# Patient Record
Sex: FEMALE
[De-identification: names, ages, dates, MRNs, and addresses within clinical notes are randomized per-mention and may not be internally consistent; named-entity substitution may affect disease eponyms.]

---

## 2017-05-15 PROBLEM — Z00.00 ENCOUNTER FOR PREVENTIVE HEALTH EXAMINATION: Status: ACTIVE | Noted: 2017-05-15

## 2017-05-22 ENCOUNTER — APPOINTMENT (OUTPATIENT)
Dept: RADIOLOGY | Facility: HOSPITAL | Age: 37
End: 2017-05-22

## 2021-06-29 ENCOUNTER — TRANSCRIPTION ENCOUNTER (OUTPATIENT)
Age: 41
End: 2021-06-29

## 2022-11-09 ENCOUNTER — RESULT REVIEW (OUTPATIENT)
Age: 42
End: 2022-11-09

## 2024-07-22 ENCOUNTER — NON-APPOINTMENT (OUTPATIENT)
Age: 44
End: 2024-07-22

## 2024-07-22 ENCOUNTER — APPOINTMENT (OUTPATIENT)
Dept: OPHTHALMOLOGY | Facility: CLINIC | Age: 44
End: 2024-07-22
Payer: COMMERCIAL

## 2024-07-22 PROCEDURE — 92083 EXTENDED VISUAL FIELD XM: CPT

## 2024-07-22 PROCEDURE — 99204 OFFICE O/P NEW MOD 45 MIN: CPT

## 2024-07-22 PROCEDURE — 92133 CPTRZD OPH DX IMG PST SGM ON: CPT

## 2024-07-23 ENCOUNTER — NON-APPOINTMENT (OUTPATIENT)
Age: 44
End: 2024-07-23

## 2024-08-26 ENCOUNTER — APPOINTMENT (OUTPATIENT)
Dept: OPHTHALMOLOGY | Facility: CLINIC | Age: 44
End: 2024-08-26
Payer: COMMERCIAL

## 2024-08-26 ENCOUNTER — TRANSCRIPTION ENCOUNTER (OUTPATIENT)
Age: 44
End: 2024-08-26

## 2024-08-26 ENCOUNTER — NON-APPOINTMENT (OUTPATIENT)
Age: 44
End: 2024-08-26

## 2024-08-26 PROCEDURE — 76514 ECHO EXAM OF EYE THICKNESS: CPT

## 2024-08-26 PROCEDURE — 92002 INTRM OPH EXAM NEW PATIENT: CPT

## 2024-09-25 ENCOUNTER — APPOINTMENT (OUTPATIENT)
Dept: PEDIATRIC ALLERGY IMMUNOLOGY | Facility: CLINIC | Age: 44
End: 2024-09-25
Payer: COMMERCIAL

## 2024-09-25 VITALS
OXYGEN SATURATION: 98 % | HEIGHT: 66.54 IN | SYSTOLIC BLOOD PRESSURE: 123 MMHG | WEIGHT: 191 LBS | HEART RATE: 75 BPM | BODY MASS INDEX: 30.34 KG/M2 | DIASTOLIC BLOOD PRESSURE: 78 MMHG

## 2024-09-25 DIAGNOSIS — Z91.09 OTHER ALLERGY STATUS, OTHER THAN TO DRUGS AND BIOLOGICAL SUBSTANCES: ICD-10-CM

## 2024-09-25 DIAGNOSIS — M35.00 SICCA SYNDROME, UNSPECIFIED: ICD-10-CM

## 2024-09-25 DIAGNOSIS — T78.1XXA OTHER ADVERSE FOOD REACTIONS, NOT ELSEWHERE CLASSIFIED, INITIAL ENCOUNTER: ICD-10-CM

## 2024-09-25 DIAGNOSIS — J30.81 ALLERGIC RHINITIS DUE TO ANIMAL (CAT) (DOG) HAIR AND DANDER: ICD-10-CM

## 2024-09-25 PROCEDURE — 95004 PERQ TESTS W/ALRGNC XTRCS: CPT

## 2024-09-25 PROCEDURE — 99204 OFFICE O/P NEW MOD 45 MIN: CPT | Mod: 25

## 2024-09-25 RX ORDER — HYDROXYCHLOROQUINE SULFATE 100 MG/1
100 TABLET ORAL
Refills: 0 | Status: ACTIVE | COMMUNITY

## 2024-09-25 NOTE — REASON FOR VISIT
[Initial Consultation] : an initial consultation for [Allergy Evaluation/ Skin Testing] : allergy evaluation and or skin testing [Congestion] : congestion [Runny Nose] : runny nose [To Food] : allergy to food

## 2024-09-26 PROBLEM — Z91.09 HOUSE DUST MITE ALLERGY: Status: ACTIVE | Noted: 2024-09-26

## 2024-09-26 PROBLEM — J30.81 ALLERGIC RHINITIS DUE TO ANIMAL DANDER: Status: ACTIVE | Noted: 2024-09-26

## 2024-09-26 PROBLEM — T78.1XXA ADVERSE REACTION TO FOOD, INITIAL ENCOUNTER: Status: ACTIVE | Noted: 2024-09-26

## 2024-09-27 NOTE — REVIEW OF SYSTEMS
right upper arm [Nl] : Genitourinary [Fatigue] : no fatigue [Fever] : no fever [Decreased Appetite] : no decrease in appetite [Nausea] : no nausea [Vomiting] : no vomiting [Diarrhea] : no diarrhea [Abdominal Pain] : no abdominal pain [Decrease In Appetite] : appetite not decreased

## 2024-09-27 NOTE — END OF VISIT
[FreeTextEntry3] : FRANCOISE Redmond has acted like a scribe on my behalf. I have reviewed the note and edited where appropriate. History, PE, assessment, and plan were personally performed by me.

## 2024-09-27 NOTE — HISTORY OF PRESENT ILLNESS
[de-identified] : This is a 44-year-old female presenting for an initial consultation.  There is a history of wheezing, throat tightness and itchy throat after ingestion of mushroom and chili peppers in May 2024. She was in [place] in [place] at the time. Patient took benadryl with good relief of symptoms. There was another episode of throat tightness after eating mushroom and chili peppers a month later at a different restaurant.  Patient subsequently seen by PCP who ordered labs. Labs were negative for mushroom and chili peppers. Since her reactions, she has incorporated and tolerated mushrooms in her diet without any issues. Patient has yet not tried chili peppers but has tried other peppers since. Denies history of reflux.  During Spring and Fall, patient gets mild runny nose, nasal congestion and post-nasal drip. Does not take anything for this as her symptoms are mild. 10 years ago, patient had allergy testing +tree pollen and cats.  No asthma. No eczema. No medication allergies. No pets at home.

## 2024-09-27 NOTE — HISTORY OF PRESENT ILLNESS
[de-identified] : This is a 44-year-old female presenting for an initial consultation.  There is a history of wheezing, throat tightness and itchy throat after ingestion of mushroom and chili peppers in May 2024. She was in [place] in [place] at the time. Patient took benadryl with good relief of symptoms. There was another episode of throat tightness after eating mushroom and chili peppers a month later at a different restaurant.  Patient subsequently seen by PCP who ordered labs. Labs were negative for mushroom and chili peppers. Since her reactions, she has incorporated and tolerated mushrooms in her diet without any issues. Patient has yet not tried chili peppers but has tried other peppers since. Denies history of reflux.  During Spring and Fall, patient gets mild runny nose, nasal congestion and post-nasal drip. Does not take anything for this as her symptoms are mild. 10 years ago, patient had allergy testing +tree pollen and cats.  No asthma. No eczema. No medication allergies. No pets at home.

## 2024-09-27 NOTE — HISTORY OF PRESENT ILLNESS
[de-identified] : This is a 44-year-old female presenting for an initial consultation.  There is a history of wheezing, throat tightness and itchy throat after ingestion of mushroom and chili peppers in May 2024. She was in [place] in [place] at the time. Patient took benadryl with good relief of symptoms. There was another episode of throat tightness after eating mushroom and chili peppers a month later at a different restaurant.  Patient subsequently seen by PCP who ordered labs. Labs were negative for mushroom and chili peppers. Since her reactions, she has incorporated and tolerated mushrooms in her diet without any issues. Patient has yet not tried chili peppers but has tried other peppers since. Denies history of reflux.  During Spring and Fall, patient gets mild runny nose, nasal congestion and post-nasal drip. Does not take anything for this as her symptoms are mild. 10 years ago, patient had allergy testing +tree pollen and cats.  No asthma. No eczema. No medication allergies. No pets at home.

## 2025-02-27 ENCOUNTER — APPOINTMENT (OUTPATIENT)
Dept: OPHTHALMOLOGY | Facility: CLINIC | Age: 45
End: 2025-02-27
Payer: COMMERCIAL

## 2025-02-27 ENCOUNTER — NON-APPOINTMENT (OUTPATIENT)
Age: 45
End: 2025-02-27

## 2025-02-27 PROCEDURE — 92012 INTRM OPH EXAM EST PATIENT: CPT

## 2025-02-27 PROCEDURE — 92083 EXTENDED VISUAL FIELD XM: CPT

## 2025-06-06 ENCOUNTER — APPOINTMENT (OUTPATIENT)
Dept: UROLOGY | Facility: CLINIC | Age: 45
End: 2025-06-06
Payer: COMMERCIAL

## 2025-06-06 VITALS
BODY MASS INDEX: 30.34 KG/M2 | SYSTOLIC BLOOD PRESSURE: 125 MMHG | HEART RATE: 74 BPM | OXYGEN SATURATION: 100 % | HEIGHT: 66.54 IN | DIASTOLIC BLOOD PRESSURE: 86 MMHG | RESPIRATION RATE: 16 BRPM | WEIGHT: 191 LBS

## 2025-06-06 PROCEDURE — 99204 OFFICE O/P NEW MOD 45 MIN: CPT

## 2025-06-11 LAB — BACTERIA UR CULT: NORMAL

## 2025-06-12 LAB
ANION GAP SERPL CALC-SCNC: 12 MMOL/L
APTT BLD: 31.3 SEC
BUN SERPL-MCNC: 14 MG/DL
CALCIUM SERPL-MCNC: 9.8 MG/DL
CHLORIDE SERPL-SCNC: 103 MMOL/L
CO2 SERPL-SCNC: 24 MMOL/L
CREAT SERPL-MCNC: 0.81 MG/DL
EGFRCR SERPLBLD CKD-EPI 2021: 91 ML/MIN/1.73M2
GLUCOSE SERPL-MCNC: 90 MG/DL
HCT VFR BLD CALC: 36.9 %
HGB BLD-MCNC: 11.8 G/DL
INR PPP: 0.97 RATIO
MCHC RBC-ENTMCNC: 28.6 PG
MCHC RBC-ENTMCNC: 32 G/DL
MCV RBC AUTO: 89.3 FL
PLATELET # BLD AUTO: 331 K/UL
POTASSIUM SERPL-SCNC: 4.1 MMOL/L
PT BLD: 11.4 SEC
RBC # BLD: 4.13 M/UL
RBC # FLD: 13.2 %
SODIUM SERPL-SCNC: 139 MMOL/L
WBC # FLD AUTO: 11.47 K/UL

## 2025-06-18 ENCOUNTER — APPOINTMENT (OUTPATIENT)
Dept: CT IMAGING | Facility: CLINIC | Age: 45
End: 2025-06-18

## 2025-06-23 ENCOUNTER — APPOINTMENT (OUTPATIENT)
Dept: UROLOGY | Facility: CLINIC | Age: 45
End: 2025-06-23
Payer: COMMERCIAL

## 2025-06-23 PROCEDURE — 52000 CYSTOURETHROSCOPY: CPT

## 2025-06-24 LAB — URINE CYTOLOGY: NORMAL

## 2025-07-10 ENCOUNTER — OUTPATIENT (OUTPATIENT)
Dept: OUTPATIENT SERVICES | Facility: HOSPITAL | Age: 45
LOS: 1 days | End: 2025-07-10
Payer: MEDICARE

## 2025-07-10 ENCOUNTER — APPOINTMENT (OUTPATIENT)
Dept: CT IMAGING | Facility: IMAGING CENTER | Age: 45
End: 2025-07-10

## 2025-07-10 DIAGNOSIS — R31.29 OTHER MICROSCOPIC HEMATURIA: ICD-10-CM

## 2025-07-10 PROCEDURE — 74178 CT ABD&PLV WO CNTR FLWD CNTR: CPT

## 2025-07-10 PROCEDURE — 74178 CT ABD&PLV WO CNTR FLWD CNTR: CPT | Mod: 26

## 2025-07-16 ENCOUNTER — APPOINTMENT (OUTPATIENT)
Dept: UROLOGY | Facility: CLINIC | Age: 45
End: 2025-07-16
Payer: COMMERCIAL

## 2025-07-16 PROBLEM — R31.29 HEMATURIA, MICROSCOPIC: Status: ACTIVE | Noted: 2025-06-06

## 2025-07-16 PROCEDURE — 99213 OFFICE O/P EST LOW 20 MIN: CPT

## 2025-08-14 ENCOUNTER — APPOINTMENT (OUTPATIENT)
Dept: OPHTHALMOLOGY | Facility: CLINIC | Age: 45
End: 2025-08-14
Payer: COMMERCIAL

## 2025-08-14 ENCOUNTER — NON-APPOINTMENT (OUTPATIENT)
Age: 45
End: 2025-08-14

## 2025-08-14 PROCEDURE — 92083 EXTENDED VISUAL FIELD XM: CPT

## 2025-08-14 PROCEDURE — 92012 INTRM OPH EXAM EST PATIENT: CPT
